# Patient Record
Sex: MALE | Race: WHITE | NOT HISPANIC OR LATINO | Employment: OTHER | URBAN - METROPOLITAN AREA
[De-identification: names, ages, dates, MRNs, and addresses within clinical notes are randomized per-mention and may not be internally consistent; named-entity substitution may affect disease eponyms.]

---
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
## 2019-01-24 ENCOUNTER — HOSPITAL ENCOUNTER (OUTPATIENT)
Dept: URGENT CARE | Facility: CLINIC | Age: 19
Discharge: HOME | End: 2019-01-24
Attending: FAMILY MEDICINE

## 2021-05-16 VITALS — RESPIRATION RATE: 14 BRPM | WEIGHT: 141 LBS | HEIGHT: 68 IN | BODY MASS INDEX: 21.37 KG/M2

## 2022-09-29 ENCOUNTER — OFFICE VISIT (OUTPATIENT)
Dept: ORTHOPEDIC SURGERY | Facility: CLINIC | Age: 22
End: 2022-09-29

## 2022-09-29 VITALS — HEIGHT: 68 IN | WEIGHT: 141 LBS | BODY MASS INDEX: 21.37 KG/M2

## 2022-09-29 DIAGNOSIS — M25.552 LEFT HIP PAIN: Primary | ICD-10-CM

## 2022-09-29 PROCEDURE — 99203 OFFICE O/P NEW LOW 30 MIN: CPT | Performed by: ORTHOPAEDIC SURGERY

## 2022-09-29 RX ORDER — DICLOFENAC SODIUM 75 MG/1
75 TABLET, DELAYED RELEASE ORAL 2 TIMES DAILY
Qty: 60 TABLET | Refills: 1 | Status: SHIPPED | OUTPATIENT
Start: 2022-09-29 | End: 2022-09-29 | Stop reason: SDUPTHER

## 2022-09-29 RX ORDER — DICLOFENAC SODIUM 75 MG/1
75 TABLET, DELAYED RELEASE ORAL 2 TIMES DAILY
Qty: 60 TABLET | Refills: 1 | Status: SHIPPED | OUTPATIENT
Start: 2022-09-29

## 2022-09-29 NOTE — PROGRESS NOTES
"Chief Complaint  Initial Evaluation of the Left Hip     Subjective      Hermann Chávez presents to [facility] for an evaluation of left hip. Patient was doing sports PT, he got tripped and landed on the base. This occurred in April. When he fell, he landed on the hip.  Patient states that pain along the lateral and posterior hip. He does notice low back pain as well. He had done 12 visits of therapy. Running and going up stairs worsen his pain. He is present today with MRI results.     Allergies   Allergen Reactions   • Azithromycin Unknown - Low Severity   • Erythromycin Unknown - Low Severity        Social History     Socioeconomic History   • Marital status: Single   Tobacco Use   • Smoking status: Never Smoker   • Smokeless tobacco: Never Used        Review of Systems     Objective   Vital Signs:   Ht 172.7 cm (68\")   Wt 64 kg (141 lb)   BMI 21.44 kg/m²       Physical Exam  Constitutional:       Appearance: Normal appearance. Patient is well-developed and normal weight.   HENT:      Head: Normocephalic.      Right Ear: Hearing and external ear normal.      Left Ear: Hearing and external ear normal.      Nose: Nose normal.   Eyes:      Conjunctiva/sclera: Conjunctivae normal.   Cardiovascular:      Rate and Rhythm: Normal rate.   Pulmonary:      Effort: Pulmonary effort is normal.      Breath sounds: No wheezing or rales.   Abdominal:      Palpations: Abdomen is soft.      Tenderness: There is no abdominal tenderness.   Musculoskeletal:      Cervical back: Normal range of motion.   Skin:     Findings: No rash.   Neurological:      Mental Status: Patient is alert and oriented to person, place, and time.   Psychiatric:         Mood and Affect: Mood and affect normal.         Judgment: Judgment normal.       Ortho Exam      LEFT HIP: Good tone of hip flexors, hip extensors, hip adductor, hip abductors. Non-antalgic gait. Non-tender to palpation. Full passive hip range of motion. " Dorsal Pedal Pulse 2+, posterior tibialis pulse 2+. Calf soft.       Procedures      Imaging Results (Most Recent)     None           Result Review :         No results found.          Assessment and Plan     Diagnoses and all orders for this visit:    1. Left hip pain (Primary)        Discussed obtaining an MRI arthrogram, anti-inflammatory medication and physical therapy.   Plan for anti-inflammatory medication and prescription for PT written.     Call or return if worsening symptoms.    Follow Up     4-6 weeks       Patient was given instructions and counseling regarding his condition or for health maintenance advice. Please see specific information pulled into the AVS if appropriate.     Scribed for Yancy Escobedo MD by Harriet Roper.  09/29/22   09:27 EDT    I have personally performed the services described in this document as scribed by the above individual and it is both accurate and complete. Yancy Escobedo MD 09/29/22

## 2023-01-24 ENCOUNTER — OFFICE VISIT (OUTPATIENT)
Dept: ORTHOPEDIC SURGERY | Facility: CLINIC | Age: 23
End: 2023-01-24
Payer: COMMERCIAL

## 2023-01-24 VITALS — HEIGHT: 68 IN | TEMPERATURE: 96.4 F | BODY MASS INDEX: 23.79 KG/M2 | WEIGHT: 157 LBS

## 2023-01-24 DIAGNOSIS — R52 PAIN: Primary | ICD-10-CM

## 2023-01-24 DIAGNOSIS — S73.192A TEAR OF LEFT ACETABULAR LABRUM, INITIAL ENCOUNTER: ICD-10-CM

## 2023-01-24 PROCEDURE — 99213 OFFICE O/P EST LOW 20 MIN: CPT | Performed by: NURSE PRACTITIONER

## 2023-01-24 PROCEDURE — 73501 X-RAY EXAM HIP UNI 1 VIEW: CPT | Performed by: NURSE PRACTITIONER

## 2023-01-24 NOTE — PROGRESS NOTES
"Patient: Hermann Chávez  YOB: 2000 22 y.o. male  Medical Record Number: [id number]    Chief Complaints:   Chief Complaint   Patient presents with   • Left Hip - Initial Evaluation       History of Present Illness:Hermann Chávez is a 22 y.o. male who presents as a new patient both myself as well as to the practice with complaints of severe left hip pain.  Patient reports he was playing baseball in April of last year he slid into a base and at that point had acute onset of left hip pain which persisted.  He then saw his medical care provider and had an MRI in July of last year which unfortunately did show a superior anterior labral tear.  He has tried physical therapy as well as scheduled anti-inflammatories with no improvement.  This is limiting this young patient every day the pain and mechanical symptoms in the left hip joint.    Allergies:   Allergies   Allergen Reactions   • Azithromycin Unknown - Low Severity   • Erythromycin Unknown - Low Severity       Medications:   Current Outpatient Medications   Medication Sig Dispense Refill   • diclofenac (VOLTAREN) 75 MG EC tablet Take 1 tablet by mouth 2 (Two) Times a Day. 60 tablet 1     No current facility-administered medications for this visit.         The following portions of the patient's history were reviewed and updated as appropriate: allergies, current medications, past family history, past medical history, past social history, past surgical history and problem list.    Review of Systems:   A 14 point review of systems was performed. All systems negative except pertinent positives/negative listed in HPI above    Physical Exam:   Vitals:    01/24/23 0832   Temp: 96.4 °F (35.8 °C)   TempSrc: Temporal   Weight: 71.2 kg (157 lb)   Height: 172.7 cm (67.99\")       General: A and O x 3, ASA, NAD    SCLERA:    Normal    Skin clear no unusual lesions noted  Left patient is nontender palpation he does have pain with internal ex rotation with a positive " [facility] positive logroll calf soft and nontender       Radiology:  Xrays 2 views of left hip were ordered and reviewed today secondary to pain and were normal.  No compared to views available    Assessment/Plan: Labral tear left hip with continued severe pain, failed conservative measures    Patient and I discussed options, unfortunately he is very young, has a known labral tear confirmed on MRI.  I think at this point we need to have him see Dr. Milo Shah for further evaluation and probable hip arthroscopy.  Patient was agreeable, we placed an urgent referral, patient will call if he has any other questions or concerns, Tylenol as needed      Laura Cancino, APRN  1/24/2023

## 2024-06-14 ENCOUNTER — HOSPITAL ENCOUNTER (EMERGENCY)
Facility: HOSPITAL | Age: 24
Discharge: HOME | End: 2024-06-15
Attending: EMERGENCY MEDICINE
Payer: OTHER GOVERNMENT

## 2024-06-14 ENCOUNTER — APPOINTMENT (OUTPATIENT)
Dept: GENERAL RADIOLOGY | Facility: HOSPITAL | Age: 24
End: 2024-06-14
Payer: OTHER GOVERNMENT

## 2024-06-14 VITALS
DIASTOLIC BLOOD PRESSURE: 72 MMHG | BODY MASS INDEX: 25.34 KG/M2 | HEIGHT: 69 IN | RESPIRATION RATE: 16 BRPM | SYSTOLIC BLOOD PRESSURE: 133 MMHG | WEIGHT: 171.08 LBS | OXYGEN SATURATION: 96 % | HEART RATE: 105 BPM | TEMPERATURE: 100 F

## 2024-06-14 DIAGNOSIS — J10.1 INFLUENZA A: Primary | ICD-10-CM

## 2024-06-14 LAB — S PYO AG THROAT QL: NEGATIVE

## 2024-06-14 PROCEDURE — 71045 X-RAY EXAM CHEST 1 VIEW: CPT

## 2024-06-14 PROCEDURE — 99283 EMERGENCY DEPT VISIT LOW MDM: CPT

## 2024-06-14 PROCEDURE — 87880 STREP A ASSAY W/OPTIC: CPT

## 2024-06-14 PROCEDURE — 87081 CULTURE SCREEN ONLY: CPT

## 2024-06-15 NOTE — DISCHARGE INSTRUCTIONS
Chest x-ray is negative for underlying pneumonia  Please look at your MyChart your strep results.  If anything is positive we will call you with treatment options.    Drink plenty of fluids, rest, take OTC tylenol/ibuprofen as needed for headache/body aches/fever.

## 2024-06-15 NOTE — ED PROVIDER NOTES
Time: 9:41 PM EDT  Date of encounter:  6/14/2024  Independent Historian/Clinical History and Information was obtained by:   Patient    History is limited by: N/A    Chief Complaint: URI      History of Present Illness:  Patient is a 23 y.o. year old male who presents to the emergency department for evaluation of bodyaches, fever, chills, cough and headache.  Was diagnosed with flu this past Monday.  Has been taking Tamiflu, diclofenac and promethazine.  Took 500 mg Tylenol PTA    HPI    Patient Care Team  Primary Care Provider: Provider, No Known    Past Medical History:     Allergies   Allergen Reactions    Azithromycin Unknown - Low Severity    Erythromycin Unknown - Low Severity     No past medical history on file.  No past surgical history on file.  No family history on file.    Home Medications:  Prior to Admission medications    Medication Sig Start Date End Date Taking? Authorizing Provider   diclofenac (VOLTAREN) 50 MG EC tablet Take 1 tablet by mouth 3 (Three) Times a Day for 10 days. 6/11/24 6/21/24  Keven Barrios MD   oseltamivir (TAMIFLU) 75 MG capsule Take 1 capsule by mouth 2 (Two) Times a Day for 5 days. 6/12/24 6/17/24  Keven Barrios MD   promethazine-dextromethorphan (PROMETHAZINE-DM) 6.25-15 MG/5ML syrup Take 5 mL by mouth 4 (Four) Times a Day As Needed for Cough. 6/11/24   Keven Barrios MD        Social History:   Social History     Tobacco Use    Smoking status: Never     Passive exposure: Never    Smokeless tobacco: Never   Vaping Use    Vaping status: Never Used   Substance Use Topics    Alcohol use: Not Currently    Drug use: Never         Review of Systems:  Review of Systems   Constitutional:  Positive for chills, diaphoresis, fatigue and fever.   Eyes: Negative.    Respiratory:  Positive for cough.    Cardiovascular: Negative.    Gastrointestinal: Negative.    Endocrine: Negative.    Genitourinary: Negative.    Musculoskeletal:  Positive for myalgias.   Skin:  "Negative.    Allergic/Immunologic: Negative.    Neurological:  Positive for headaches.   Hematological: Negative.    Psychiatric/Behavioral: Negative.          Physical Exam:  /72 (BP Location: Left arm, Patient Position: Sitting)   Pulse 105   Temp 100 °F (37.8 °C) (Oral)   Resp 16   Ht 175.3 cm (69\")   Wt 77.6 kg (171 lb 1.2 oz)   SpO2 96%   BMI 25.26 kg/m²     Physical Exam  Vitals and nursing note reviewed.   Constitutional:       Appearance: Normal appearance. He is normal weight.   HENT:      Head: Normocephalic and atraumatic.      Nose: Nose normal.      Mouth/Throat:      Mouth: Mucous membranes are moist.      Pharynx: Oropharyngeal exudate and posterior oropharyngeal erythema present.   Eyes:      Extraocular Movements: Extraocular movements intact.      Conjunctiva/sclera: Conjunctivae normal.      Pupils: Pupils are equal, round, and reactive to light.   Cardiovascular:      Rate and Rhythm: Normal rate and regular rhythm.      Heart sounds: Normal heart sounds.   Pulmonary:      Effort: Pulmonary effort is normal.      Breath sounds: Normal breath sounds.   Abdominal:      General: Abdomen is flat.      Palpations: Abdomen is soft.   Musculoskeletal:         General: Normal range of motion.      Cervical back: Normal range of motion and neck supple.   Skin:     General: Skin is warm and dry.      Coloration: Skin is not cyanotic.   Neurological:      General: No focal deficit present.      Mental Status: He is alert and oriented to person, place, and time.   Psychiatric:         Attention and Perception: Attention and perception normal.         Mood and Affect: Mood normal.         Behavior: Behavior normal.               Procedures:  Procedures      Medical Decision Making:      Comorbidities that affect care:    None    External Notes reviewed:    Previous Clinic Note: Urgent care visit 6/11/2024      The following orders were placed and all results were independently analyzed by " me:  Orders Placed This Encounter   Procedures    Rapid Strep A Screen - Swab, Throat    XR Chest 1 View       Medications Given in the Emergency Department:  Medications - No data to display     ED Course:    ED Course as of 06/14/24 2335   Fri Jun 14, 2024 2142 --- PROVIDER IN TRIAGE NOTE ---    The patient was evaluated by Raquel franco in triage. Orders were placed and the patient is currently awaiting disposition.    [AJ]      ED Course User Index  [AJ] Raquel Hickman PA-C       Labs:    Lab Results (last 24 hours)       ** No results found for the last 24 hours. **             Imaging:    XR Chest 1 View    Result Date: 6/14/2024  XR CHEST 1 VW Date of Exam: 6/14/2024 9:49 PM EDT Indication: cough/fever Comparison: None available. Findings: The heart and pulmonary vasculature are within normal limits. No evidence of pneumothorax or large pleural effusion. There are no active appearing infiltrates.     Impression: No active disease. Electronically Signed: Taiwo Cedeno MD  6/14/2024 9:57 PM EDT  Workstation ID: [id number]       Differential Diagnosis and Discussion:    Fever: Based on the complaint of fever, differential diagnosis includes but is not limited to meningitis, pneumonia, pyelonephritis, acute uti,  systemic immune response syndrome, sepsis, viral syndrome, fungal infection, tick born illness and other bacterial infections.  Sore Throat: Differential diagnosis includes but is not limited to bacterial infection, viral infection, inhaled irritants, sinus drainage, thyroiditis, epiglottitis, and retropharyngeal abscess.  Viral syndrome: Differential diagnosis includes but is not limited to influenza, common cold, COVID-19, RSV, adenovirus, enteroviruses, herpes virus, hepatitis virus, measles, mumps, rubella, dengue fever, and possible bacterial infection.    All X-rays impressions were independently interpreted by me.    MDM               Patient Care Considerations:    ANTIBIOTICS: I  considered prescribing antibiotics as an outpatient however no bacterial focus of infection was found.      Consultants/Shared Management Plan:    None    Social Determinants of Health:    Patient is independent, reliable, and has access to care.       Disposition and Care Coordination:    Discharged: The patient is suitable and stable for discharge with no need for consideration of admission.    I have explained the patient´s condition, diagnoses and treatment plan based on the information available to me at this time. I have answered questions and addressed any concerns. The patient has a good  understanding of the patient´s diagnosis, condition, and treatment plan as can be expected at this point. The vital signs have been stable. The patient´s condition is stable and appropriate for discharge from the emergency department.      The patient will pursue further outpatient evaluation with the primary care physician or other designated or consulting physician as outlined in the discharge instructions. They are agreeable to this plan of care and follow-up instructions have been explained in detail. The patient has received these instructions in written format and has expressed an understanding of the discharge instructions. The patient is aware that any significant change in condition or worsening of symptoms should prompt an immediate return to this or the closest emergency department or call to 911.    Final diagnoses:   Influenza A        ED Disposition       ED Disposition   Discharge    Condition   Stable    Comment   --               This medical record created using voice recognition software.             Raquel Hickman PA-C  06/14/24 [id number]

## 2024-06-17 LAB — BACTERIA SPEC AEROBE CULT: NORMAL

## 2024-12-09 ENCOUNTER — APPOINTMENT (OUTPATIENT)
Dept: GENERAL RADIOLOGY | Facility: HOSPITAL | Age: 24
End: 2024-12-09
Payer: OTHER GOVERNMENT

## 2024-12-09 ENCOUNTER — HOSPITAL ENCOUNTER (EMERGENCY)
Facility: HOSPITAL | Age: 24
Discharge: HOME | End: 2024-12-09
Attending: EMERGENCY MEDICINE | Admitting: EMERGENCY MEDICINE
Payer: OTHER GOVERNMENT

## 2024-12-09 VITALS
RESPIRATION RATE: 16 BRPM | WEIGHT: 176.37 LBS | OXYGEN SATURATION: 98 % | HEART RATE: 82 BPM | TEMPERATURE: 98.2 F | HEIGHT: 69 IN | DIASTOLIC BLOOD PRESSURE: 66 MMHG | BODY MASS INDEX: 26.12 KG/M2 | SYSTOLIC BLOOD PRESSURE: 125 MMHG

## 2024-12-09 DIAGNOSIS — S73.192A TEAR OF LEFT ACETABULAR LABRUM, INITIAL ENCOUNTER: ICD-10-CM

## 2024-12-09 DIAGNOSIS — M25.552 LEFT HIP PAIN: Primary | ICD-10-CM

## 2024-12-09 PROCEDURE — 96372 THER/PROPH/DIAG INJ SC/IM: CPT

## 2024-12-09 PROCEDURE — 73502 X-RAY EXAM HIP UNI 2-3 VIEWS: CPT

## 2024-12-09 PROCEDURE — 25010000002 KETOROLAC TROMETHAMINE PER 15 MG

## 2024-12-09 PROCEDURE — 99283 EMERGENCY DEPT VISIT LOW MDM: CPT

## 2024-12-09 PROCEDURE — 25010000002 DEXAMETHASONE SODIUM PHOSPHATE 10 MG/ML SOLUTION

## 2024-12-09 RX ORDER — DEXAMETHASONE SODIUM PHOSPHATE 10 MG/ML
10 INJECTION, SOLUTION INTRAMUSCULAR; INTRAVENOUS ONCE
Status: COMPLETED | OUTPATIENT
Start: 2024-12-09 | End: 2024-12-09

## 2024-12-09 RX ORDER — ORPHENADRINE CITRATE 100 MG/1
100 TABLET ORAL 2 TIMES DAILY
Qty: 20 TABLET | Refills: 0 | Status: SHIPPED | OUTPATIENT
Start: 2024-12-09

## 2024-12-09 RX ORDER — TRAMADOL HYDROCHLORIDE 50 MG/1
50 TABLET ORAL ONCE
Status: COMPLETED | OUTPATIENT
Start: 2024-12-09 | End: 2024-12-09

## 2024-12-09 RX ORDER — KETOROLAC TROMETHAMINE 30 MG/ML
30 INJECTION, SOLUTION INTRAMUSCULAR; INTRAVENOUS ONCE
Status: COMPLETED | OUTPATIENT
Start: 2024-12-09 | End: 2024-12-09

## 2024-12-09 RX ORDER — PREDNISONE 20 MG/1
TABLET ORAL
Qty: 18 TABLET | Refills: 0 | Status: SHIPPED | OUTPATIENT
Start: 2024-12-09 | End: 2024-12-17

## 2024-12-09 RX ADMIN — KETOROLAC TROMETHAMINE 30 MG: 30 INJECTION, SOLUTION INTRAMUSCULAR; INTRAVENOUS at 18:24

## 2024-12-09 RX ADMIN — DEXAMETHASONE SODIUM PHOSPHATE 10 MG: 10 INJECTION INTRAMUSCULAR; INTRAVENOUS at 18:25

## 2024-12-09 RX ADMIN — TRAMADOL HYDROCHLORIDE 50 MG: 50 TABLET, COATED ORAL at 16:51

## 2024-12-09 NOTE — ED PROVIDER NOTES
Time: 3:37 PM EST  Date of encounter:  12/9/2024  Room number: 62/62  Independent Historian/Clinical History and Information was obtained by:   Patient    History is limited by: N/A    Chief Complaint: hip pain       History of Present Illness:  Patient is a 24 y.o. year old male who presents to the emergency department for evaluation of left hip pain.  Patient started having left hip discomfort in 2021 when when he sustained a labrum injury while in the .  He reports the pain has been manageable until here recently.  He has been taken ibuprofen and using lidocaine patches and has had no relief.  He is working with the  medical department/personnel for possible surgery in 6 to 9 months.  They however referred him here to the emergency department for pain management.  He has had no new injury.  Pain is described to be worse with activity.  It radiates down into his left butt cheek and left symphysis pubis area.  He has had no new injury.  Last MRI was reported to be a couple of years ago.      HPI    Patient Care Team  Primary Care Provider: Provider, No Known    Past Medical History:     Allergies   Allergen Reactions    Azithromycin Unknown - Low Severity    Erythromycin Unknown - Low Severity     History reviewed. No pertinent past medical history.  History reviewed. No pertinent surgical history.  History reviewed. No pertinent family history.    Home Medications:  Prior to Admission medications    Medication Sig Start Date End Date Taking? Authorizing Provider   promethazine-dextromethorphan (PROMETHAZINE-DM) 6.25-15 MG/5ML syrup Take 5 mL by mouth 4 (Four) Times a Day As Needed for Cough. 6/11/24   Keven Barrios MD        Social History:   Social History     Tobacco Use    Smoking status: Never     Passive exposure: Never    Smokeless tobacco: Never   Vaping Use    Vaping status: Never Used   Substance Use Topics    Alcohol use: Not Currently    Drug use: Never         Review of  "Systems:  Review of Systems     I performed a review of systems today, which was all negative, except for the positives found in the HPI above.    Physical Exam:  /66 (BP Location: Left arm, Patient Position: Sitting)   Pulse 82   Temp 98.2 °F (36.8 °C) (Oral)   Resp 16   Ht 175.3 cm (69\")   Wt 80 kg (176 lb 5.9 oz)   SpO2 98%   BMI 26.05 kg/m²     Physical Exam   General:  Awake alert no apparent distress    Head: Normocephalic, atraumatic, eyes PERRLA EOMI, nose normal, oropharynx normal    Neck: Supple, no meningismus, no cervical lymphadenopathy or JVD    Heart: Regular rate and rhythm, no murmurs or rubs, 2+ radial pulses    Lungs: Clear to auscultation bilaterally, no wheezing or rhonchi or rales, no respiratory distress    Abdomen: Soft, nontender, nondistended, no signs of peritonitis    Skin: Warm, dry, no rash    Musculoskeletal: Normal range of motion, no obvious deformities, no edema noted.  Pelvis is stable.  No tenderness on exam.    Neurologic: Awake, alert, oriented x 3, no motor or sensory deficits appreciated    Psych: No suicidal or homicidal ideations, no psychosis          Procedures:  Procedures      Medical Decision Making:      Comorbidities that affect care:    Previous left hip injury,  labrum tear    External Notes reviewed:          The following orders were placed and all results were independently analyzed by me:  Orders Placed This Encounter   Procedures    XR Hip With or Without Pelvis 2 - 3 View Left    Ambulatory Referral to Orthopedic Surgery       Medications Given in the Emergency Department:  Medications   traMADol (ULTRAM) tablet 50 mg (50 mg Oral Given 12/9/24 1651)   dexAMETHasone sodium phosphate injection 10 mg (10 mg Intramuscular Given 12/9/24 1825)   ketorolac (TORADOL) injection 30 mg (30 mg Intramuscular Given 12/9/24 1824)        ED Course:    ED Course as of 12/09/24 1833   Mon Dec 09, 2024   1754 Pt reports little relief and was inquiring about a " cortisone injection.  Patient was advised by his superiors to come to the emergency department for an intra-articular steroid injection.  I have explained to patient that those are not an ER procedure but we did discuss a referral to Ortho.  Patient verbalizes understanding [MS]   1823 Due to patient being in a chair room/exam room, links of bilateral lower extremities are focal to assess however patient's stature does appear to be normal he does not appear to have 1 leg longer or shorter than the other and there does not appear to be internal or external rotation.  There is also no subcutaneous emphysema or crepitus felt on exam. [MS]      ED Course User Index  [MS] Uma Martinison, [facility]       Labs:    Lab Results (last 24 hours)       ** No results found for the last 24 hours. **             Imaging:    XR Hip With or Without Pelvis 2 - 3 View Left    Result Date: 12/9/2024  XR HIP W OR WO PELVIS 2-3 VIEW LEFT Date of Exam: 12/9/2024 4:38 PM EST Indication: previous injury Comparison: None available. Findings: AP pelvis, AP and frog-lateral views of the left hip were obtained. There is no fracture or dislocation. Both femoral heads are well-seated within the acetabulum and the joint space is maintained. The sacroiliac joints and pubic symphysis are normal.     Impression: Unremarkable left hip radiographs. Electronically Signed: Samreen Maurice MD  12/9/2024 5:12 PM EST  Workstation ID: [id number]       Differential Diagnosis and Discussion:    Extremity Pain: Differential diagnosis includes but is not limited to soft tissue sprain, tendonitis, tendon injury, dislocation, fracture, deep vein thrombosis, arterial insufficiency, osteoarthritis, bursitis, and ligamentous damage.    All X-rays impressions were independently interpreted by me.    MDM                 Patient Care Considerations:    MRI: I considered ordering an MRI however patient had no neurodeficits.      Consultants/Shared Management  Plan:    None    Social Determinants of Health:    Patient is independent, reliable, and has access to care.       Disposition and Care Coordination:    Discharged: The patient is suitable and stable for discharge with no need for consideration of admission.    I have explained the patient´s condition, diagnoses and treatment plan based on the information available to me at this time. I have answered questions and addressed any concerns. The patient has a good  understanding of the patient´s diagnosis, condition, and treatment plan as can be expected at this point. The vital signs have been stable. The patient´s condition is stable and appropriate for discharge from the emergency department.      The patient will pursue further outpatient evaluation with the primary care physician or other designated or consulting physician as outlined in the discharge instructions. They are agreeable to this plan of care and follow-up instructions have been explained in detail. The patient has received these instructions in written format and has expressed an understanding of the discharge instructions. The patient is aware that any significant change in condition or worsening of symptoms should prompt an immediate return to this or the closest emergency department or call to 911.    Final diagnoses:   Left hip pain   Tear of left acetabular labrum, initial encounter (history of)        ED Disposition       ED Disposition   Discharge    Condition   Stable    Comment   --               This medical record created using voice recognition software.       Uma Martin, APRN  12/09/24 [id number]

## 2024-12-09 NOTE — DISCHARGE INSTRUCTIONS
Please continue to work with your assigned medical personnel with the .  I have sent a referral in your behalf to one of our orthopedic surgeons.  Someone from their office should reach out to you within 2-3 business days.  I would recommend going ahead and scheduling the appointment while you continue to work with the .  I have also prescribed you medications to take to help with your pain and discomfort.  If it anytime however you become unable to bear any weight on your left extremity, if you notice any redness or warmth to your joint, or develop any loss of sensation to the extremity please return to the ER.  Otherwise follow-up with your assigned medical personnel or the orthopedic surgeon.